# Patient Record
Sex: MALE | Race: WHITE | NOT HISPANIC OR LATINO | Employment: FULL TIME | URBAN - METROPOLITAN AREA
[De-identification: names, ages, dates, MRNs, and addresses within clinical notes are randomized per-mention and may not be internally consistent; named-entity substitution may affect disease eponyms.]

---

## 2017-12-21 ENCOUNTER — TRANSCRIBE ORDERS (OUTPATIENT)
Dept: ADMINISTRATIVE | Facility: HOSPITAL | Age: 37
End: 2017-12-21

## 2017-12-21 DIAGNOSIS — R51.9 FREQUENT HEADACHES: Primary | ICD-10-CM

## 2019-08-22 ENCOUNTER — TELEPHONE (OUTPATIENT)
Dept: HEMATOLOGY ONCOLOGY | Facility: CLINIC | Age: 39
End: 2019-08-22

## 2022-02-03 ENCOUNTER — TELEPHONE (OUTPATIENT)
Dept: GASTROENTEROLOGY | Facility: CLINIC | Age: 42
End: 2022-02-03

## 2022-02-03 NOTE — TELEPHONE ENCOUNTER
Recall call went out via [employer] in 3/2021 with no return calls from pt to schedule  Pt is due for an appt with Dr Aditya Mendoza for hx of diarrhea  I called home number and spoke to wife whom will let pt know that we called  If do not hear back from pt in 2 weeks, will call his cell number

## 2022-02-17 NOTE — TELEPHONE ENCOUNTER
I lmoc for pt to please call back to schedule an appt with Dr Richard Ryan  Will wait for pt's call back at this time

## 2024-04-18 ENCOUNTER — OFFICE VISIT (OUTPATIENT)
Dept: GASTROENTEROLOGY | Facility: CLINIC | Age: 44
End: 2024-04-18
Payer: COMMERCIAL

## 2024-04-18 VITALS
SYSTOLIC BLOOD PRESSURE: 115 MMHG | HEART RATE: 71 BPM | DIASTOLIC BLOOD PRESSURE: 79 MMHG | BODY MASS INDEX: 29.4 KG/M2 | HEIGHT: 68 IN | WEIGHT: 194 LBS

## 2024-04-18 DIAGNOSIS — Z12.11 SCREENING FOR COLON CANCER: ICD-10-CM

## 2024-04-18 DIAGNOSIS — K58.0 IRRITABLE BOWEL SYNDROME WITH DIARRHEA: Primary | ICD-10-CM

## 2024-04-18 PROCEDURE — 99203 OFFICE O/P NEW LOW 30 MIN: CPT | Performed by: INTERNAL MEDICINE

## 2024-04-18 RX ORDER — ELUXADOLINE 100 MG/1
100 TABLET, FILM COATED ORAL 2 TIMES DAILY
Qty: 30 TABLET | Refills: 5 | Status: SHIPPED | OUTPATIENT
Start: 2024-04-18

## 2024-04-18 NOTE — PROGRESS NOTES
[facility] - Outpatient Consultation  Hair Hussein 43 y.o. male MRN: [id number]  Encounter: [id number]        ASSESSMENT AND PLAN:       Diagnoses and all orders for this visit:    Irritable bowel syndrome with diarrhea  Previously symptoms were controlled with long-term use of Lomotil.  Has tried OTC Imodium to maximum dose with no effect.  Has not tried Viberzi.  No history of cholecystectomy.    -     Eluxadoline (Viberzi) 100 MG TABS; Take 1 tablet (100 mg total) by mouth 2 (two) times a day  -     CBC; Future  -     Comprehensive metabolic panel; Future    Screening for colon cancer  Normal colonoscopy in 2018.  Will be due for repeat in 2028  ______________________________________________________________________    HPI: Patient with long history of chronic intermittent diarrhea and left lower quadrant abdominal pain.  Has undergone extensive evaluation including EGD, colonoscopy as well as laboratory testing with no pathology identified.  Has tried a number of treatments including a course of Xifaxan without significant improvement.  Last seen here in 2018.  Has had worsening diarrhea in recent months, unable to make it to work without having to stop to have a loose bowel movement.  His left lower quadrant abdominal pain is relieved with defecation.  No nausea or vomiting, fever or chills, jaundice or rash, blood in his stool, unexplained weight loss.  Does also have a history of iron deficiency anemia.      REVIEW OF SYSTEMS:    ROS     Historical Information   History reviewed. No pertinent past medical history.  Past Surgical History:   Procedure Laterality Date    SPINAL FUSION  2019     Social History   Social History     Substance and Sexual Activity   Alcohol Use Yes    Alcohol/week: 5.0 standard drinks of alcohol    Types: 1 Glasses of wine, 4 Cans of beer per week     Social History     Substance and Sexual Activity   Drug Use Never     Social History     Tobacco Use  "  Smoking Status Never   Smokeless Tobacco Never     History reviewed. No pertinent family history.    Meds/Allergies       Current Outpatient Medications:     Eluxadoline (Viberzi) 100 MG TABS    Allergies   Allergen Reactions    Sulfa Antibiotics Fever and Hives           Objective     Blood pressure 115/79, pulse 71, height 5' 8\" (1.727 m), weight 88 kg (194 lb). Body mass index is 29.5 kg/m².        PHYSICAL EXAM:      Physical Exam  Vitals and nursing note reviewed.   Constitutional:       General: He is not in acute distress.  HENT:      Head: Normocephalic and atraumatic.      Mouth/Throat:      Mouth: Mucous membranes are moist.   Eyes:      General: No scleral icterus.     Pupils: Pupils are equal, round, and reactive to light.   Cardiovascular:      Rate and Rhythm: Normal rate and regular rhythm.   Pulmonary:      Effort: Pulmonary effort is normal. No respiratory distress.   Abdominal:      General: There is no distension.      Palpations: Abdomen is soft.      Tenderness: There is no abdominal tenderness. There is no guarding or rebound.   Musculoskeletal:         General: Normal range of motion.      Cervical back: Normal range of motion and neck supple.      Right lower leg: No edema.      Left lower leg: No edema.   Skin:     General: Skin is warm and dry.   Neurological:      General: No focal deficit present.      Mental Status: He is alert and oriented to person, place, and time.   Psychiatric:         Mood and Affect: Mood normal.         Behavior: Behavior normal.              Lab Results:   No visits with results within 1 Day(s) from this visit.   Latest known visit with results is:   No results found for any previous visit.         Radiology Results:   No results found.  "

## 2024-04-22 ENCOUNTER — TELEPHONE (OUTPATIENT)
Age: 44
End: 2024-04-22

## 2024-04-22 NOTE — TELEPHONE ENCOUNTER
Patients GI provider:  Dr. Cannon    Number to return call: [phone]    Reason for call: Pt calling because his prescription for   Eluxadoline (Viberzi) 100 MG TABS   Was not approved by insurance and he would like to know how to proceed? He says the Dr told him there were other medication options iof needed and the pharmacy also gave him paper work on a program he can apply for for help with payment if this is the med the Dr wants him to take. Please reach out to advise Pt.       Scheduled procedure/appointment date if applicable: 04.18.24

## 2024-04-22 NOTE — TELEPHONE ENCOUNTER
I called patient and left message per consent and advised if he wants to apply for assistance for the medication he could do that and we can fill out the provider portion or if he wants to see if theres an alternate medication we could do that as well and to c/b to let us know what route he would like to go.

## 2024-05-20 DIAGNOSIS — K58.0 IRRITABLE BOWEL SYNDROME WITH DIARRHEA: Primary | ICD-10-CM

## 2024-05-20 RX ORDER — AMITRIPTYLINE HYDROCHLORIDE 25 MG/1
25 TABLET, FILM COATED ORAL
Qty: 30 TABLET | Refills: 5 | Status: SHIPPED | OUTPATIENT
Start: 2024-05-20

## 2024-11-01 ENCOUNTER — OFFICE VISIT (OUTPATIENT)
Dept: GASTROENTEROLOGY | Facility: CLINIC | Age: 44
End: 2024-11-01
Payer: COMMERCIAL

## 2024-11-01 ENCOUNTER — TELEPHONE (OUTPATIENT)
Dept: GASTROENTEROLOGY | Facility: CLINIC | Age: 44
End: 2024-11-01

## 2024-11-01 VITALS
DIASTOLIC BLOOD PRESSURE: 85 MMHG | WEIGHT: 206.4 LBS | HEIGHT: 68 IN | BODY MASS INDEX: 31.28 KG/M2 | HEART RATE: 83 BPM | SYSTOLIC BLOOD PRESSURE: 127 MMHG

## 2024-11-01 DIAGNOSIS — D50.9 MICROCYTIC ANEMIA: ICD-10-CM

## 2024-11-01 DIAGNOSIS — Z12.11 SCREENING FOR COLON CANCER: ICD-10-CM

## 2024-11-01 DIAGNOSIS — K58.0 IRRITABLE BOWEL SYNDROME WITH DIARRHEA: Primary | ICD-10-CM

## 2024-11-01 PROCEDURE — 99214 OFFICE O/P EST MOD 30 MIN: CPT | Performed by: INTERNAL MEDICINE

## 2024-11-01 NOTE — TELEPHONE ENCOUNTER
Scheduled date of colonoscopy (as of today): 12/31/24  Physician performing colonoscopy: Dr Cannon  Location of colonoscopy:   Bowel prep reviewed with patient: Mirlax w/ dul given at appt   Instructions reviewed with patient by: ls  Clearances: n/a

## 2024-11-01 NOTE — PROGRESS NOTES
Ambulatory Visit  Name: Hair Hussein      : 1980      MRN: [id number]  Encounter Provider: Nahum Cannon MD  Encounter Date: 2024   Encounter department: [facility] [address]    Assessment & Plan  Irritable bowel syndrome with diarrhea  Will continue amitriptyline 25 mg once daily in the morning in combination with Imodium as needed.   Orders:    amitriptyline (ELAVIL) 25 mg tablet; Take 1 tablet (25 mg total) by mouth daily    Screening for colon cancer  Will plan age-appropriate screening colonoscopy  Orders:    Colonoscopy; Future    Microcytic anemia  History of iron deficiency anemia of unclear etiology.  Will plan EGD at the time of colonoscopy  Orders:    EGD; Future      History of Present Illness     Hair Hussein is a 43 y.o. male who presents above diarrhea predominant IBS.  Has previously had an extensive evaluation with no other etiologies noted.  Colonoscopy and EGD 7 years ago with unremarkable duodenal biopsies.  Has had improvement of symptoms on amitriptyline in combination with Imodium but still has episodes of urgency a few times per week.  He finds it works better for him to take these in the morning.  Abdominal pain is minimal.  No blood, no unexplained weight loss.  Recent labs reviewed revealing elevated ALT at 6, hemoglobin 11.5 with MCV 64.7      Review of Systems   Constitutional:  Negative for fever.   Gastrointestinal:  Positive for abdominal pain and diarrhea. Negative for constipation, nausea and vomiting.   Genitourinary:  Negative for dysuria, frequency and hematuria.   Musculoskeletal:  Negative for arthralgias and myalgias.   Neurological:  Negative for headaches.       Answers submitted by the patient for this visit:  Abdominal Pain Questionnaire (Submitted on 10/28/2024)  Chief Complaint: Abdominal pain  anorexia: No  belching: No  flatus: No  hematochezia: No  melena: No  weight loss: No  Relieved by: bowel  "movements      Objective     /85 (BP Location: Left arm, Patient Position: Sitting, Cuff Size: Standard)   Pulse 83   Ht 5' 8\" (1.727 m)   Wt 93.6 kg (206 lb 6.4 oz)   BMI 31.38 kg/m²     Physical Exam    "

## 2024-11-04 DIAGNOSIS — Z00.6 ENCOUNTER FOR EXAMINATION FOR NORMAL COMPARISON OR CONTROL IN CLINICAL RESEARCH PROGRAM: ICD-10-CM

## 2024-11-11 ENCOUNTER — APPOINTMENT (OUTPATIENT)
Dept: LAB | Facility: CLINIC | Age: 44
End: 2024-11-11

## 2024-11-11 DIAGNOSIS — K58.0 IRRITABLE BOWEL SYNDROME WITH DIARRHEA: ICD-10-CM

## 2024-11-11 DIAGNOSIS — Z00.6 ENCOUNTER FOR EXAMINATION FOR NORMAL COMPARISON OR CONTROL IN CLINICAL RESEARCH PROGRAM: ICD-10-CM

## 2024-11-11 PROCEDURE — 36415 COLL VENOUS BLD VENIPUNCTURE: CPT

## 2024-11-21 LAB
APOB+LDLR+PCSK9 GENE MUT ANL BLD/T: NOT DETECTED
BRCA1+BRCA2 DEL+DUP + FULL MUT ANL BLD/T: NOT DETECTED
MLH1+MSH2+MSH6+PMS2 GN DEL+DUP+FUL M: NOT DETECTED

## 2024-11-22 ENCOUNTER — RESULTS FOLLOW-UP (OUTPATIENT)
Dept: GASTROENTEROLOGY | Facility: CLINIC | Age: 44
End: 2024-11-22

## 2024-12-16 ENCOUNTER — TELEPHONE (OUTPATIENT)
Age: 44
End: 2024-12-16

## 2024-12-16 NOTE — TELEPHONE ENCOUNTER
Patients GI provider:  Dr. Cannon    Number to return call: [phone]    Reason for call:Josette from [employer] called in regard to colonoscopy EGD prior authorization.[facility]ure reviewed request to be done at [facility]. Case was approved.Approval number [id number] Valid from 12/13/2024 through 2/10/2025     Scheduled procedure/appointment date if applicable: 12/31/2024

## 2024-12-31 ENCOUNTER — HOSPITAL ENCOUNTER (OUTPATIENT)
Dept: GASTROENTEROLOGY | Facility: HOSPITAL | Age: 44
Setting detail: OUTPATIENT SURGERY
Discharge: HOME | End: 2024-12-31
Attending: INTERNAL MEDICINE
Payer: COMMERCIAL

## 2024-12-31 ENCOUNTER — ANESTHESIA EVENT (OUTPATIENT)
Dept: GASTROENTEROLOGY | Facility: HOSPITAL | Age: 44
End: 2024-12-31
Payer: COMMERCIAL

## 2024-12-31 ENCOUNTER — ANESTHESIA (OUTPATIENT)
Dept: GASTROENTEROLOGY | Facility: HOSPITAL | Age: 44
End: 2024-12-31
Payer: COMMERCIAL

## 2024-12-31 VITALS
DIASTOLIC BLOOD PRESSURE: 64 MMHG | OXYGEN SATURATION: 100 % | RESPIRATION RATE: 10 BRPM | TEMPERATURE: 97.7 F | HEART RATE: 77 BPM | SYSTOLIC BLOOD PRESSURE: 111 MMHG

## 2024-12-31 DIAGNOSIS — Z12.11 SCREENING FOR COLON CANCER: ICD-10-CM

## 2024-12-31 DIAGNOSIS — D50.9 MICROCYTIC ANEMIA: ICD-10-CM

## 2024-12-31 PROCEDURE — 45380 COLONOSCOPY AND BIOPSY: CPT | Performed by: INTERNAL MEDICINE

## 2024-12-31 PROCEDURE — 88305 TISSUE EXAM BY PATHOLOGIST: CPT | Performed by: PATHOLOGY

## 2024-12-31 PROCEDURE — 44361 SMALL BOWEL ENDOSCOPY/BIOPSY: CPT | Performed by: INTERNAL MEDICINE

## 2024-12-31 PROCEDURE — 45385 COLONOSCOPY W/LESION REMOVAL: CPT | Performed by: INTERNAL MEDICINE

## 2024-12-31 RX ORDER — LIDOCAINE HYDROCHLORIDE 20 MG/ML
INJECTION, SOLUTION EPIDURAL; INFILTRATION; INTRACAUDAL; PERINEURAL AS NEEDED
Status: DISCONTINUED | OUTPATIENT
Start: 2024-12-31 | End: 2024-12-31

## 2024-12-31 RX ORDER — PROPOFOL 10 MG/ML
INJECTION, EMULSION INTRAVENOUS AS NEEDED
Status: DISCONTINUED | OUTPATIENT
Start: 2024-12-31 | End: 2024-12-31

## 2024-12-31 RX ORDER — SODIUM CHLORIDE, SODIUM LACTATE, POTASSIUM CHLORIDE, CALCIUM CHLORIDE 600; 310; 30; 20 MG/100ML; MG/100ML; MG/100ML; MG/100ML
INJECTION, SOLUTION INTRAVENOUS CONTINUOUS PRN
Status: DISCONTINUED | OUTPATIENT
Start: 2024-12-31 | End: 2024-12-31

## 2024-12-31 RX ORDER — PROPOFOL 10 MG/ML
INJECTION, EMULSION INTRAVENOUS CONTINUOUS PRN
Status: DISCONTINUED | OUTPATIENT
Start: 2024-12-31 | End: 2024-12-31

## 2024-12-31 RX ADMIN — PROPOFOL 50 MG: 10 INJECTION, EMULSION INTRAVENOUS at 12:46

## 2024-12-31 RX ADMIN — SODIUM CHLORIDE, SODIUM LACTATE, POTASSIUM CHLORIDE, AND CALCIUM CHLORIDE: .6; .31; .03; .02 INJECTION, SOLUTION INTRAVENOUS at 11:50

## 2024-12-31 RX ADMIN — PROPOFOL 100 MG: 10 INJECTION, EMULSION INTRAVENOUS at 12:44

## 2024-12-31 RX ADMIN — PROPOFOL 50 MG: 10 INJECTION, EMULSION INTRAVENOUS at 12:45

## 2024-12-31 RX ADMIN — PROPOFOL 50 MG: 10 INJECTION, EMULSION INTRAVENOUS at 12:47

## 2024-12-31 RX ADMIN — PROPOFOL 120 MCG/KG/MIN: 10 INJECTION, EMULSION INTRAVENOUS at 12:53

## 2024-12-31 RX ADMIN — Medication 40 MG: at 12:55

## 2024-12-31 RX ADMIN — LIDOCAINE HYDROCHLORIDE 100 MG: 20 INJECTION, SOLUTION EPIDURAL; INFILTRATION; INTRACAUDAL; PERINEURAL at 12:44

## 2024-12-31 RX ADMIN — PROPOFOL 50 MG: 10 INJECTION, EMULSION INTRAVENOUS at 12:50

## 2024-12-31 RX ADMIN — PROPOFOL 50 MG: 10 INJECTION, EMULSION INTRAVENOUS at 12:48

## 2024-12-31 RX ADMIN — PROPOFOL 50 MG: 10 INJECTION, EMULSION INTRAVENOUS at 12:52

## 2024-12-31 NOTE — ANESTHESIA PREPROCEDURE EVALUATION
Procedure:  COLONOSCOPY  EGD    Relevant Problems   No relevant active problems        Physical Exam    Airway    Mallampati score: II  TM Distance: >3 FB  Neck ROM: full     Dental   No notable dental hx     Cardiovascular  Cardiovascular exam normal    Pulmonary  Pulmonary exam normal     Other Findings        Anesthesia Plan  ASA Score- 2     Anesthesia Type- IV sedation with anesthesia with ASA Monitors.         Additional Monitors:     Airway Plan:            Plan Factors-Exercise tolerance (METS): >4 METS.    Chart reviewed.   Existing labs reviewed. Patient summary reviewed.    Patient is not a current smoker.      Obstructive sleep apnea risk education given perioperatively.        Induction-     Postoperative Plan-     Perioperative Resuscitation Plan - Level 1 - Full Code.       Informed Consent- Anesthetic plan and risks discussed with patient.  I personally reviewed this patient with the CRNA. Discussed and agreed on the Anesthesia Plan with the CRNA..

## 2024-12-31 NOTE — ANESTHESIA POSTPROCEDURE EVALUATION
Post-Op Assessment Note    CV Status:  Stable    Pain management: adequate       Mental Status:  Alert and awake   Hydration Status:  Euvolemic   PONV Controlled:  Controlled   Airway Patency:  Patent     Post Op Vitals Reviewed: Yes    No anethesia notable event occurred.    Staff: Anesthesiologist           Last Filed PACU Vitals:  Vitals Value Taken Time   Temp 97.7 °F (36.5 °C) 12/31/24 1318   Pulse 77 12/31/24 1338   /58 12/31/24 1328   Resp 10 12/31/24 1338   SpO2 100 % 12/31/24 1338       Modified Becky:  Activity: 2 (12/31/2024  1:38 PM)  Respiration: 2 (12/31/2024  1:38 PM)  Circulation: 2 (12/31/2024  1:38 PM)  Consciousness: 2 (12/31/2024  1:38 PM)  Oxygen Saturation: 2 (12/31/2024  1:38 PM)  Modified Becky Score: 10 (12/31/2024  1:38 PM)

## 2024-12-31 NOTE — H&P
History and Physical -  [facility]  Hair Hussein 44 y.o. male MRN: [id number]                  HPI: Hair Hussein is a 44 y.o. year old male who presents for colon cancer screening, evaluation of iron deficiency anemia      REVIEW OF SYSTEMS: Per the HPI, and otherwise unremarkable.    Historical Information   History reviewed. No pertinent past medical history.  Past Surgical History:   Procedure Laterality Date    SPINAL FUSION  2019    T2-L2 -hardware     Social History   Social History     Substance and Sexual Activity   Alcohol Use Yes    Alcohol/week: 5.0 standard drinks of alcohol    Types: 1 Glasses of wine, 4 Cans of beer per week     Social History     Substance and Sexual Activity   Drug Use Never     Social History     Tobacco Use   Smoking Status Never   Smokeless Tobacco Never     History reviewed. No pertinent family history.    Meds/Allergies       Current Outpatient Medications:     amitriptyline (ELAVIL) 25 mg tablet    loperamide (IMODIUM) 2 mg capsule    Allergies   Allergen Reactions    Sulfa Antibiotics Fever and Hives       Objective     There were no vitals taken for this visit.      PHYSICAL EXAM    Gen: NAD  Head: NCAT  CV: RRR  CHEST: Clear  ABD: soft, NT/ND  EXT: no edema      ASSESSMENT/PLAN:  This is a 44 y.o. year old male here for EGD, colonoscopy, and he is stable and optimized for his procedure.

## 2025-01-03 PROCEDURE — 88305 TISSUE EXAM BY PATHOLOGIST: CPT | Performed by: PATHOLOGY

## 2025-01-20 ENCOUNTER — PREP FOR PROCEDURE (OUTPATIENT)
Dept: GASTROENTEROLOGY | Facility: CLINIC | Age: 45
End: 2025-01-20

## 2025-01-20 ENCOUNTER — RESULTS FOLLOW-UP (OUTPATIENT)
Dept: GASTROENTEROLOGY | Facility: CLINIC | Age: 45
End: 2025-01-20

## 2025-01-20 DIAGNOSIS — D50.9 MICROCYTIC ANEMIA: Primary | ICD-10-CM

## 2025-01-20 RX ORDER — SODIUM CHLORIDE, SODIUM LACTATE, POTASSIUM CHLORIDE, CALCIUM CHLORIDE 600; 310; 30; 20 MG/100ML; MG/100ML; MG/100ML; MG/100ML
125 INJECTION, SOLUTION INTRAVENOUS CONTINUOUS
OUTPATIENT
Start: 2025-01-20

## 2025-01-20 NOTE — RESULT ENCOUNTER NOTE
Please call the patient regarding results.  Bile were all benign.  No evidence of celiac disease.  No H. pylori or precancerous changes in the stomach.  Colon polyps were benign.  Repeat colonoscopy in 3 years.  No explanation for his iron deficiency anemia.  Would recommend capsule endoscopy to further evaluate the small bowel for possible bleeding.  I will place the order.

## 2025-02-12 ENCOUNTER — TELEPHONE (OUTPATIENT)
Dept: GASTROENTEROLOGY | Facility: CLINIC | Age: 45
End: 2025-02-12

## 2025-02-12 NOTE — TELEPHONE ENCOUNTER
lmm for pt to call back and schedule capsule  If pt calls back please forward call to Sil at [address]. Thanks Sb

## 2025-02-21 NOTE — TELEPHONE ENCOUNTER
Pt returning call to schedule a capsule endoscopy I called the office but Natali was off and they said she will call the pt back on Monday. I relayed the message and the pt will wait to here from her on Monday

## 2025-03-31 ENCOUNTER — TELEPHONE (OUTPATIENT)
Dept: GASTROENTEROLOGY | Facility: CLINIC | Age: 45
End: 2025-03-31

## 2025-03-31 ENCOUNTER — PROCEDURE VISIT (OUTPATIENT)
Dept: GASTROENTEROLOGY | Facility: CLINIC | Age: 45
End: 2025-03-31
Payer: COMMERCIAL

## 2025-03-31 DIAGNOSIS — D50.9 MICROCYTIC ANEMIA: ICD-10-CM

## 2025-03-31 NOTE — PROGRESS NOTES
Patient here for capsule endoscopy. Tolerated bowel prep. Remained npo after midnight. I went over all instructions for capsule endoscopy. Patient verbalized understanding. Patient will return at 4 pm.

## 2025-04-09 PROCEDURE — 91110 GI TRC IMG INTRAL ESOPH-ILE: CPT | Performed by: INTERNAL MEDICINE

## 2025-04-26 DIAGNOSIS — K58.0 IRRITABLE BOWEL SYNDROME WITH DIARRHEA: ICD-10-CM
